# Patient Record
Sex: FEMALE | Race: BLACK OR AFRICAN AMERICAN | NOT HISPANIC OR LATINO | Employment: FULL TIME | URBAN - METROPOLITAN AREA
[De-identification: names, ages, dates, MRNs, and addresses within clinical notes are randomized per-mention and may not be internally consistent; named-entity substitution may affect disease eponyms.]

---

## 2024-02-26 ENCOUNTER — ANCILLARY PROCEDURE (OUTPATIENT)
Dept: MRI IMAGING | Facility: CLINIC | Age: 44
End: 2024-02-26
Attending: PHYSICIAN ASSISTANT
Payer: COMMERCIAL

## 2024-02-26 DIAGNOSIS — C50.919 RECURRENT BREAST CANCER (H): ICD-10-CM

## 2024-02-26 PROCEDURE — A9585 GADOBUTROL INJECTION: HCPCS | Performed by: OBSTETRICS & GYNECOLOGY

## 2024-02-26 PROCEDURE — 77049 MRI BREAST C-+ W/CAD BI: CPT

## 2024-02-26 PROCEDURE — 255N000002 HC RX 255 OP 636: Performed by: OBSTETRICS & GYNECOLOGY

## 2024-02-26 RX ORDER — GADOBUTROL 604.72 MG/ML
6.5 INJECTION INTRAVENOUS ONCE
Status: COMPLETED | OUTPATIENT
Start: 2024-02-26 | End: 2024-02-26

## 2024-02-26 RX ADMIN — GADOBUTROL 6.5 ML: 604.72 INJECTION INTRAVENOUS at 14:51

## 2024-04-27 ENCOUNTER — HOSPITAL ENCOUNTER (EMERGENCY)
Facility: CLINIC | Age: 44
Discharge: HOME | End: 2024-04-27
Attending: EMERGENCY MEDICINE | Admitting: EMERGENCY MEDICINE
Payer: COMMERCIAL

## 2024-04-27 ENCOUNTER — APPOINTMENT (OUTPATIENT)
Dept: CT IMAGING | Facility: CLINIC | Age: 44
End: 2024-04-27
Attending: EMERGENCY MEDICINE
Payer: COMMERCIAL

## 2024-04-27 VITALS
RESPIRATION RATE: 17 BRPM | HEART RATE: 88 BPM | BODY MASS INDEX: 21.62 KG/M2 | SYSTOLIC BLOOD PRESSURE: 108 MMHG | TEMPERATURE: 97.2 F | OXYGEN SATURATION: 97 % | HEIGHT: 69 IN | DIASTOLIC BLOOD PRESSURE: 88 MMHG | WEIGHT: 145.94 LBS

## 2024-04-27 DIAGNOSIS — Z90.13 HISTORY OF BILATERAL MASTECTOMY: ICD-10-CM

## 2024-04-27 DIAGNOSIS — L24.5 IRRITANT CONTACT DERMATITIS DUE TO OTHER CHEMICAL PRODUCTS: ICD-10-CM

## 2024-04-27 DIAGNOSIS — N64.89 SECONDARY SEROMA OF BREAST: ICD-10-CM

## 2024-04-27 LAB
ANION GAP SERPL CALCULATED.3IONS-SCNC: 8 MMOL/L (ref 7–15)
BASOPHILS # BLD AUTO: 0.1 10E3/UL (ref 0–0.2)
BASOPHILS NFR BLD AUTO: 1 %
BUN SERPL-MCNC: 13.6 MG/DL (ref 6–20)
CALCIUM SERPL-MCNC: 8.7 MG/DL (ref 8.6–10)
CHLORIDE SERPL-SCNC: 100 MMOL/L (ref 98–107)
CREAT SERPL-MCNC: 0.79 MG/DL (ref 0.51–0.95)
DEPRECATED HCO3 PLAS-SCNC: 23 MMOL/L (ref 22–29)
EGFRCR SERPLBLD CKD-EPI 2021: >90 ML/MIN/1.73M2
EOSINOPHIL # BLD AUTO: 0.7 10E3/UL (ref 0–0.7)
EOSINOPHIL NFR BLD AUTO: 10 %
ERYTHROCYTE [DISTWIDTH] IN BLOOD BY AUTOMATED COUNT: 13.8 % (ref 10–15)
GLUCOSE SERPL-MCNC: 90 MG/DL (ref 70–99)
HCT VFR BLD AUTO: 37.6 % (ref 35–47)
HGB BLD-MCNC: 12 G/DL (ref 11.7–15.7)
IMM GRANULOCYTES # BLD: 0 10E3/UL
IMM GRANULOCYTES NFR BLD: 0 %
LYMPHOCYTES # BLD AUTO: 1.6 10E3/UL (ref 0.8–5.3)
LYMPHOCYTES NFR BLD AUTO: 23 %
MCH RBC QN AUTO: 25.6 PG (ref 26.5–33)
MCHC RBC AUTO-ENTMCNC: 31.9 G/DL (ref 31.5–36.5)
MCV RBC AUTO: 80 FL (ref 78–100)
MONOCYTES # BLD AUTO: 0.5 10E3/UL (ref 0–1.3)
MONOCYTES NFR BLD AUTO: 7 %
NEUTROPHILS # BLD AUTO: 4 10E3/UL (ref 1.6–8.3)
NEUTROPHILS NFR BLD AUTO: 59 %
NRBC # BLD AUTO: 0 10E3/UL
NRBC BLD AUTO-RTO: 0 /100
PLATELET # BLD AUTO: 303 10E3/UL (ref 150–450)
POTASSIUM SERPL-SCNC: 4.2 MMOL/L (ref 3.4–5.3)
RBC # BLD AUTO: 4.68 10E6/UL (ref 3.8–5.2)
SODIUM SERPL-SCNC: 131 MMOL/L (ref 135–145)
WBC # BLD AUTO: 6.8 10E3/UL (ref 4–11)

## 2024-04-27 PROCEDURE — 85025 COMPLETE CBC W/AUTO DIFF WBC: CPT | Performed by: EMERGENCY MEDICINE

## 2024-04-27 PROCEDURE — 250N000011 HC RX IP 250 OP 636: Performed by: EMERGENCY MEDICINE

## 2024-04-27 PROCEDURE — 250N000009 HC RX 250: Performed by: EMERGENCY MEDICINE

## 2024-04-27 PROCEDURE — 71260 CT THORAX DX C+: CPT

## 2024-04-27 PROCEDURE — 36415 COLL VENOUS BLD VENIPUNCTURE: CPT | Performed by: EMERGENCY MEDICINE

## 2024-04-27 PROCEDURE — 80048 BASIC METABOLIC PNL TOTAL CA: CPT | Performed by: EMERGENCY MEDICINE

## 2024-04-27 PROCEDURE — 99285 EMERGENCY DEPT VISIT HI MDM: CPT | Mod: 25

## 2024-04-27 RX ORDER — IOPAMIDOL 755 MG/ML
500 INJECTION, SOLUTION INTRAVASCULAR ONCE
Status: COMPLETED | OUTPATIENT
Start: 2024-04-27 | End: 2024-04-27

## 2024-04-27 RX ORDER — PREDNISONE 20 MG/1
40 TABLET ORAL DAILY
Qty: 8 TABLET | Refills: 0 | Status: SHIPPED | OUTPATIENT
Start: 2024-04-27 | End: 2024-05-01

## 2024-04-27 RX ADMIN — IOPAMIDOL 73 ML: 755 INJECTION, SOLUTION INTRAVENOUS at 10:46

## 2024-04-27 RX ADMIN — SODIUM CHLORIDE 64 ML: 9 INJECTION, SOLUTION INTRAVENOUS at 10:46

## 2024-04-27 ASSESSMENT — ACTIVITIES OF DAILY LIVING (ADL)
ADLS_ACUITY_SCORE: 33
ADLS_ACUITY_SCORE: 35
ADLS_ACUITY_SCORE: 35

## 2024-04-27 ASSESSMENT — COLUMBIA-SUICIDE SEVERITY RATING SCALE - C-SSRS
6. HAVE YOU EVER DONE ANYTHING, STARTED TO DO ANYTHING, OR PREPARED TO DO ANYTHING TO END YOUR LIFE?: NO
1. IN THE PAST MONTH, HAVE YOU WISHED YOU WERE DEAD OR WISHED YOU COULD GO TO SLEEP AND NOT WAKE UP?: NO
2. HAVE YOU ACTUALLY HAD ANY THOUGHTS OF KILLING YOURSELF IN THE PAST MONTH?: NO

## 2024-04-27 NOTE — DISCHARGE INSTRUCTIONS
You should use a warm pack on your stroma, you can also use the Ace wrap for gentle pressure.  You should keep an eye out for potential signs of infection increasing increasing pain, redness, swelling or pus coming from your wounds.  I do recommend you follow-up with your surgeon for recheck.

## 2024-04-27 NOTE — ED TRIAGE NOTES
Double mastectomy early April related to breast cancer, had expanders place and drains. Endorses a rash started shortly after related to the tap and has persisted since then. Coming in today after getting drains removed Wednesday, she noticed fluid collection under the tissue. Stopped abx on wednesday as well. [facility]

## 2024-04-27 NOTE — ED PROVIDER NOTES
"  History     Chief Complaint:  Breast Pain    HPI   Chacha Sylvester is a 44 year old female with history of breast cancer who presents with left breast discomfort and fluid buildup. Patient had a bilateral mastectomy on 4/5/24 and had drains placed that were taken out 3 days ago. She then began experiencing rash and breast discomfort. Rash is on her torso and spreads to her arms. Endorses known left breast numbness postoperatively. No fever or chills. Denies any other medical problems. She does note that she has had cancer once in the past for which she received radiation, though she has had no radiation with this course.     Independent Historian:   Patient reports history as above.     Review of External Notes:    None     Medications:    Tamoxifen  Citalopram  Lidocaine     Past Medical History:    Breast cancer    Physical Exam   Patient Vitals for the past 24 hrs:   BP Temp Temp src Pulse Resp SpO2 Height Weight   04/27/24 1134 -- -- -- -- -- 97 % -- --   04/27/24 1133 -- -- -- -- -- 98 % -- --   04/27/24 1132 -- -- -- -- -- 98 % -- --   04/27/24 1131 -- -- -- -- -- 98 % -- --   04/27/24 1130 -- -- -- -- -- 98 % -- --   04/27/24 1002 108/88 97.2  F (36.2  C) Oral 88 17 100 % 1.753 m (5' 9\") 66.2 kg (145 lb 15.1 oz)        Physical Exam  Constitutional: Alert, attentive, GCS 15   Eyes: EOM are normal, anicteric, conjugate gaze  CV: distal extremities warm, well perfused  Chest: Non-labored breathing on RA. Right-sided total mastectomy with overlying scaling erythematous rash across right yobani chest on the right upper arm. Left breast with some superior fluctuance. No overlying erythema. No overt tenderness. Drain site clean, dry, and intact. Areola tissue intact.   GI:  non tender. No distension. No guarding or rebound.    Neurological: Alert, attentive, moving all extremities equally.   Skin: Skin is warm and dry.    Emergency Department Course     Imaging:  CT Chest w Contrast   Final Result   IMPRESSION:  "   1.  Bilateral mastectomies with tissue expanders in place. Small amount of fluid about both tissue expanders, left greater than right, which is nonspecific though may represent seromas.   2.  No focal consolidation or pleural effusion.   3.  Few tiny left upper lobe nodules are nonspecific. Recommend attention on follow-up oncologic imaging. If no follow-up imaging is planned, recommend follow up CT chest in 3-6 months given history of malignancy.        Laboratory:  Labs Ordered and Resulted from Time of ED Arrival to Time of ED Departure   BASIC METABOLIC PANEL - Abnormal       Result Value    Sodium 131 (*)     Potassium 4.2      Chloride 100      Carbon Dioxide (CO2) 23      Anion Gap 8      Urea Nitrogen 13.6      Creatinine 0.79      GFR Estimate >90      Calcium 8.7      Glucose 90     CBC WITH PLATELETS AND DIFFERENTIAL - Abnormal    WBC Count 6.8      RBC Count 4.68      Hemoglobin 12.0      Hematocrit 37.6      MCV 80      MCH 25.6 (*)     MCHC 31.9      RDW 13.8      Platelet Count 303      % Neutrophils 59      % Lymphocytes 23      % Monocytes 7      % Eosinophils 10      % Basophils 1      % Immature Granulocytes 0      NRBCs per 100 WBC 0      Absolute Neutrophils 4.0      Absolute Lymphocytes 1.6      Absolute Monocytes 0.5      Absolute Eosinophils 0.7      Absolute Basophils 0.1      Absolute Immature Granulocytes 0.0      Absolute NRBCs 0.0              Emergency Department Course & Assessments:    Interventions:  Medications   iopamidol (ISOVUE-370) solution 500 mL (73 mLs Intravenous $Given 4/27/24 1046)   CT scan flush (64 mLs Intravenous $Given 4/27/24 1046)        Assessments:  1015 I entered the patient's room and obtained history.  1145 I rechecked the patient and explained findings. I believe that they are safe for discharge at this time.     Independent Interpretation (X-rays, CTs, rhythm strip):  None    Consultations/Discussion of Management or Tests:  None        Social Determinants  of Health affecting care:   None    Disposition:  The patient was discharged.     Impression & Plan    CMS Diagnoses: None       Medical Decision Makin-year-old woman unfortunate past medical history of recurrent breast cancer underwent double mastectomy earlier this month presenting for concerns for swelling of the left breast and persistent rash over the right breast.  She had her drains taken out yesterday, she had persistent fluctuance in the left breast superiorly which was a nipple sparing mastectomy, presentation here is most consistent with a seroma, she does not have fever or chills or elevated leukocytosis.  CT imaging is suggestive of a seroma.  With respect to her rash over her right breast, this is more consistent with likely contact dermatitis she does have a remote history of radiation over this area.  I recommended topical steroids, hydrating ointment, gentle chest compression with Ace wrap and warm compress for seroma.  I recommend she follow-up with her surgeon.  Return precautions reviewed and she was discharged      Diagnosis:    ICD-10-CM    1. Secondary seroma of breast  N64.89       2. History of bilateral mastectomy  Z90.13       3. Irritant contact dermatitis due to other chemical products  L24.5            Discharge Medications:  Discharge Medication List as of 2024 12:10 PM        START taking these medications    Details   predniSONE (DELTASONE) 20 MG tablet Take 2 tablets (40 mg) by mouth daily for 4 days, Disp-8 tablet, R-0, Local Print           Lyle Arteaga MD  [employer]  12:46 PM 24     Scribe Disclosure:  Rajiv THOMAS Hired, am serving as a scribe at 10:36 AM on 2024 to document services personally performed by Lyle Arteaga MD based on my observations and the provider's statements to me.   2024   Lyle Arteaga MD Dunbar, John Forrest, MD  [phone]

## 2024-09-04 ENCOUNTER — APPOINTMENT (OUTPATIENT)
Dept: CT IMAGING | Facility: CLINIC | Age: 44
End: 2024-09-04
Attending: EMERGENCY MEDICINE
Payer: COMMERCIAL

## 2024-09-04 ENCOUNTER — HOSPITAL ENCOUNTER (EMERGENCY)
Facility: CLINIC | Age: 44
Discharge: HOME | End: 2024-09-05
Attending: EMERGENCY MEDICINE | Admitting: EMERGENCY MEDICINE
Payer: COMMERCIAL

## 2024-09-04 DIAGNOSIS — N61.0 INFECTION OF LEFT BREAST: ICD-10-CM

## 2024-09-04 LAB
ALBUMIN SERPL BCG-MCNC: 3.7 G/DL (ref 3.5–5.2)
ALP SERPL-CCNC: 70 U/L (ref 40–150)
ALT SERPL W P-5'-P-CCNC: 8 U/L (ref 0–50)
ANION GAP SERPL CALCULATED.3IONS-SCNC: 12 MMOL/L (ref 7–15)
AST SERPL W P-5'-P-CCNC: 8 U/L (ref 0–45)
BASOPHILS # BLD AUTO: 0 10E3/UL (ref 0–0.2)
BASOPHILS NFR BLD AUTO: 0 %
BILIRUB SERPL-MCNC: 0.3 MG/DL
BUN SERPL-MCNC: 8.1 MG/DL (ref 6–20)
CALCIUM SERPL-MCNC: 9.1 MG/DL (ref 8.8–10.4)
CHLORIDE SERPL-SCNC: 103 MMOL/L (ref 98–107)
CREAT SERPL-MCNC: 0.75 MG/DL (ref 0.51–0.95)
EGFRCR SERPLBLD CKD-EPI 2021: >90 ML/MIN/1.73M2
EOSINOPHIL # BLD AUTO: 0.1 10E3/UL (ref 0–0.7)
EOSINOPHIL NFR BLD AUTO: 1 %
ERYTHROCYTE [DISTWIDTH] IN BLOOD BY AUTOMATED COUNT: 15.2 % (ref 10–15)
GLUCOSE SERPL-MCNC: 108 MG/DL (ref 70–99)
HCO3 BLDV-SCNC: 25 MMOL/L (ref 21–28)
HCO3 SERPL-SCNC: 23 MMOL/L (ref 22–29)
HCT VFR BLD AUTO: 32.1 % (ref 35–47)
HGB BLD-MCNC: 10.1 G/DL (ref 11.7–15.7)
HOLD SPECIMEN: NORMAL
HOLD SPECIMEN: NORMAL
IMM GRANULOCYTES # BLD: 0 10E3/UL
IMM GRANULOCYTES NFR BLD: 0 %
LACTATE BLD-SCNC: <0.3 MMOL/L
LYMPHOCYTES # BLD AUTO: 1.4 10E3/UL (ref 0.8–5.3)
LYMPHOCYTES NFR BLD AUTO: 16 %
MCH RBC QN AUTO: 24.2 PG (ref 26.5–33)
MCHC RBC AUTO-ENTMCNC: 31.5 G/DL (ref 31.5–36.5)
MCV RBC AUTO: 77 FL (ref 78–100)
MONOCYTES # BLD AUTO: 0.9 10E3/UL (ref 0–1.3)
MONOCYTES NFR BLD AUTO: 10 %
NEUTROPHILS # BLD AUTO: 6.2 10E3/UL (ref 1.6–8.3)
NEUTROPHILS NFR BLD AUTO: 72 %
NRBC # BLD AUTO: 0 10E3/UL
NRBC BLD AUTO-RTO: 0 /100
PCO2 BLDV: 40 MM HG (ref 40–50)
PH BLDV: 7.41 [PH] (ref 7.32–7.43)
PLATELET # BLD AUTO: 275 10E3/UL (ref 150–450)
PO2 BLDV: 64 MM HG (ref 25–47)
POTASSIUM SERPL-SCNC: 4 MMOL/L (ref 3.4–5.3)
PROT SERPL-MCNC: 6.9 G/DL (ref 6.4–8.3)
RBC # BLD AUTO: 4.18 10E6/UL (ref 3.8–5.2)
SAO2 % BLDV: 92 % (ref 70–75)
SODIUM SERPL-SCNC: 138 MMOL/L (ref 135–145)
WBC # BLD AUTO: 8.7 10E3/UL (ref 4–11)

## 2024-09-04 PROCEDURE — 96361 HYDRATE IV INFUSION ADD-ON: CPT

## 2024-09-04 PROCEDURE — 80053 COMPREHEN METABOLIC PANEL: CPT | Performed by: EMERGENCY MEDICINE

## 2024-09-04 PROCEDURE — 99291 CRITICAL CARE FIRST HOUR: CPT | Mod: 25

## 2024-09-04 PROCEDURE — 85025 COMPLETE CBC W/AUTO DIFF WBC: CPT | Performed by: EMERGENCY MEDICINE

## 2024-09-04 PROCEDURE — 87040 BLOOD CULTURE FOR BACTERIA: CPT | Performed by: EMERGENCY MEDICINE

## 2024-09-04 PROCEDURE — 258N000003 HC RX IP 258 OP 636: Performed by: EMERGENCY MEDICINE

## 2024-09-04 PROCEDURE — 96360 HYDRATION IV INFUSION INIT: CPT | Mod: 59

## 2024-09-04 PROCEDURE — 250N000011 HC RX IP 250 OP 636: Performed by: EMERGENCY MEDICINE

## 2024-09-04 PROCEDURE — 99292 CRITICAL CARE ADDL 30 MIN: CPT

## 2024-09-04 PROCEDURE — 85652 RBC SED RATE AUTOMATED: CPT | Performed by: EMERGENCY MEDICINE

## 2024-09-04 PROCEDURE — 36415 COLL VENOUS BLD VENIPUNCTURE: CPT | Performed by: EMERGENCY MEDICINE

## 2024-09-04 PROCEDURE — 250N000009 HC RX 250: Performed by: EMERGENCY MEDICINE

## 2024-09-04 PROCEDURE — 86140 C-REACTIVE PROTEIN: CPT | Performed by: EMERGENCY MEDICINE

## 2024-09-04 PROCEDURE — 82803 BLOOD GASES ANY COMBINATION: CPT

## 2024-09-04 PROCEDURE — 71260 CT THORAX DX C+: CPT

## 2024-09-04 RX ORDER — ONDANSETRON 4 MG/1
4 TABLET, ORALLY DISINTEGRATING ORAL ONCE
Status: COMPLETED | OUTPATIENT
Start: 2024-09-04 | End: 2024-09-04

## 2024-09-04 RX ORDER — IOPAMIDOL 755 MG/ML
500 INJECTION, SOLUTION INTRAVASCULAR ONCE
Status: COMPLETED | OUTPATIENT
Start: 2024-09-04 | End: 2024-09-04

## 2024-09-04 RX ADMIN — IOPAMIDOL 72 ML: 755 INJECTION, SOLUTION INTRAVENOUS at 23:11

## 2024-09-04 RX ADMIN — SODIUM CHLORIDE 1000 ML: 9 INJECTION, SOLUTION INTRAVENOUS at 22:19

## 2024-09-04 RX ADMIN — SODIUM CHLORIDE 64 ML: 9 INJECTION, SOLUTION INTRAVENOUS at 23:11

## 2024-09-04 RX ADMIN — ONDANSETRON 4 MG: 4 TABLET, ORALLY DISINTEGRATING ORAL at 20:44

## 2024-09-04 ASSESSMENT — COLUMBIA-SUICIDE SEVERITY RATING SCALE - C-SSRS
2. HAVE YOU ACTUALLY HAD ANY THOUGHTS OF KILLING YOURSELF IN THE PAST MONTH?: NO
1. IN THE PAST MONTH, HAVE YOU WISHED YOU WERE DEAD OR WISHED YOU COULD GO TO SLEEP AND NOT WAKE UP?: NO
6. HAVE YOU EVER DONE ANYTHING, STARTED TO DO ANYTHING, OR PREPARED TO DO ANYTHING TO END YOUR LIFE?: NO

## 2024-09-04 ASSESSMENT — ACTIVITIES OF DAILY LIVING (ADL)
ADLS_ACUITY_SCORE: 35
ADLS_ACUITY_SCORE: 33

## 2024-09-05 VITALS
BODY MASS INDEX: 21.13 KG/M2 | WEIGHT: 143.08 LBS | OXYGEN SATURATION: 98 % | RESPIRATION RATE: 11 BRPM | HEART RATE: 84 BPM | SYSTOLIC BLOOD PRESSURE: 113 MMHG | TEMPERATURE: 98.8 F | DIASTOLIC BLOOD PRESSURE: 79 MMHG

## 2024-09-05 LAB
CRP SERPL-MCNC: 155.11 MG/L
ERYTHROCYTE [SEDIMENTATION RATE] IN BLOOD BY WESTERGREN METHOD: 88 MM/HR (ref 0–20)

## 2024-09-05 PROCEDURE — 258N000003 HC RX IP 258 OP 636: Performed by: EMERGENCY MEDICINE

## 2024-09-05 PROCEDURE — 96367 TX/PROPH/DG ADDL SEQ IV INF: CPT

## 2024-09-05 PROCEDURE — 250N000011 HC RX IP 250 OP 636: Performed by: EMERGENCY MEDICINE

## 2024-09-05 PROCEDURE — 96365 THER/PROPH/DIAG IV INF INIT: CPT

## 2024-09-05 PROCEDURE — 96366 THER/PROPH/DIAG IV INF ADDON: CPT

## 2024-09-05 PROCEDURE — 96361 HYDRATE IV INFUSION ADD-ON: CPT

## 2024-09-05 RX ORDER — PIPERACILLIN SODIUM, TAZOBACTAM SODIUM 3; .375 G/15ML; G/15ML
3.38 INJECTION, POWDER, LYOPHILIZED, FOR SOLUTION INTRAVENOUS EVERY 6 HOURS
Status: DISCONTINUED | OUTPATIENT
Start: 2024-09-05 | End: 2024-09-05 | Stop reason: HOSPADM

## 2024-09-05 RX ORDER — ACETAMINOPHEN 500 MG
1000 TABLET ORAL EVERY 6 HOURS PRN
Status: DISCONTINUED | OUTPATIENT
Start: 2024-09-05 | End: 2024-09-05 | Stop reason: HOSPADM

## 2024-09-05 RX ORDER — PIPERACILLIN SODIUM, TAZOBACTAM SODIUM 4; .5 G/20ML; G/20ML
4.5 INJECTION, POWDER, LYOPHILIZED, FOR SOLUTION INTRAVENOUS ONCE
Status: COMPLETED | OUTPATIENT
Start: 2024-09-05 | End: 2024-09-05

## 2024-09-05 RX ORDER — VANCOMYCIN HYDROCHLORIDE
1250 ONCE
Status: COMPLETED | OUTPATIENT
Start: 2024-09-05 | End: 2024-09-05

## 2024-09-05 RX ORDER — MORPHINE SULFATE 4 MG/ML
4 INJECTION, SOLUTION INTRAMUSCULAR; INTRAVENOUS ONCE
Status: COMPLETED | OUTPATIENT
Start: 2024-09-05 | End: 2024-09-05

## 2024-09-05 RX ORDER — SODIUM CHLORIDE 9 MG/ML
INJECTION, SOLUTION INTRAVENOUS ONCE
Status: DISCONTINUED | OUTPATIENT
Start: 2024-09-05 | End: 2024-09-05 | Stop reason: HOSPADM

## 2024-09-05 RX ADMIN — VANCOMYCIN HYDROCHLORIDE 1250 MG: 5 INJECTION, POWDER, LYOPHILIZED, FOR SOLUTION INTRAVENOUS at 01:21

## 2024-09-05 RX ADMIN — PIPERACILLIN AND TAZOBACTAM 4.5 G: 4; .5 INJECTION, POWDER, FOR SOLUTION INTRAVENOUS at 00:45

## 2024-09-05 RX ADMIN — MORPHINE SULFATE 4 MG: 4 INJECTION INTRAVENOUS at 09:16

## 2024-09-05 RX ADMIN — PIPERACILLIN AND TAZOBACTAM 3.38 G: 3; .375 INJECTION, POWDER, FOR SOLUTION INTRAVENOUS at 07:26

## 2024-09-05 RX ADMIN — SODIUM CHLORIDE 1000 ML: 9 INJECTION, SOLUTION INTRAVENOUS at 01:50

## 2024-09-05 ASSESSMENT — ACTIVITIES OF DAILY LIVING (ADL)
ADLS_ACUITY_SCORE: 35

## 2024-09-05 NOTE — ED TRIAGE NOTES
Patient wife states that patient has breast cancer, recently had a spacer removed 08/15, Feeling great. Then took kids to [facility] over the weekend and since has had increasing pain to left breast area, redness, swelling, drainage. Patient is nauseated, febrile, weak and in increasing pain. ABCs intact.

## 2024-09-05 NOTE — PROGRESS NOTES
Paged by [facility] ED Dr. Hilliard regarding patient with recent implant placement unclear if tissue expander vs permanent implant of the left breast. From ED provider, patient not currently febrile, tachycardic, no leucocytosis, no signs of sepsis, normal lactate, and CT with no drainable fluid collection. Given that patient is not septic and there is concern for implant related infection, I recommended IV abx and possible admission at [facility] vs transfer to [facility]. I notified the operating surgeon Dr. Warner who agreed with this plan. Any transfer to [facility] would delay care for the patient, may hinder patient receiving appropriate IV abx in a timely fashion, and may delay transfer to [facility] where the operating surgeon can appropriately treat the patient. Given that patient is not septic and does not require any urgent surgical intervention, would be okay to be discharged from the ED and follow-up at [facility] in the morning if patient is agreeable to plan. Would recommend discharge on oral abx as well. ED provider hesitant with this plan. Ultimately recommended observing patient until the morning and at that time can re-discuss [facility]. Patient does not need urgent surgical debridement and therefore transfer to [facility] would not be in their best interest, rather IV abx for 24 hours would be appropriate to trial implant salvage with close observation for worsening infectious symptoms.     Feliciano Rodriguez PRS Resident

## 2024-09-05 NOTE — ED PROVIDER NOTES
Emergency Department Note      History of Present Illness     Chief Complaint   Post-op Problem and Breast Pain      HPI   Chacha Sylvester is a 44 year old female with a history as noted below who presents to the ED today for evaluation of breast pain. The patient reports she had right breast surgery to remove cancer on 8/15/24. Her incision wounds have healed well since then, but after going to [facility] today, she reports extreme right breast pain. The breast is also swollen, red, shiny-appearing, and hot to the touch, but the incisions still look good. The patient also reports fever, nausea, diarrhea, and body aches. She hasn't had a appetite for the past few days and hasn't been eating. She reports that she was declared cancer free after the breast surgery and isn't receiving chemotherapy treatment. She does report she's taking oral hormonal therapy and receives a shot to induce menopause. She denies any other chronic conditions.    Independent Historian   None    Review of External Notes   Operative note from 8/15/2024 reviewed.  Had and implant exchange of the left breast with breast expander.    Past Medical History     Medical History and Problem List   Right breast cancer  Cervical intraepithelial neopasia grade 1  Genital herpes  Malignant neoplasm of right breast  Seborrheic dermatitis   Leiomyoma of uterus    Medications   Ferrous gluconate  Keflex  Celexa  Etodolac  Norco  Letrozole  Oxycodone  Polytrim  Senna  Tamoxifen    Surgical History   Dental oral surgery  Lasik    Physical Exam     Patient Vitals for the past 24 hrs:   BP Temp Temp src Pulse Resp SpO2 Weight   09/05/24 0118 100/65 -- -- 75 -- 96 % --   09/05/24 0100 102/69 -- -- 80 -- 97 % --   09/05/24 0048 99/68 -- -- 80 -- 97 % --   09/05/24 0045 99/68 -- -- 80 -- 98 % --   09/05/24 0038 96/67 -- -- 86 -- 97 % --   09/05/24 0015 101/60 -- -- 83 -- 97 % --   09/05/24 0010 101/60 98.8  F (37.1  C) Oral 87 -- 97 % --   09/05/24 0000 99/62 --  -- 83 -- 96 % --   09/04/24 2345 103/70 -- -- 86 -- 96 % --   09/04/24 2330 109/76 -- -- 81 -- 98 % --   09/04/24 2300 -- -- -- 86 -- 98 % --   09/04/24 2245 101/74 -- -- 90 -- 95 % --   09/04/24 2230 103/74 -- -- 83 -- 95 % --   09/04/24 2225 102/72 -- -- 87 -- 99 % --   09/04/24 2039 128/79 (!) 101.7  F (38.7  C) Oral 118 20 96 % 64.9 kg (143 lb 1.3 oz)     Physical Exam  General: Laying on the ED bed  HEENT: Normocephalic, atraumatic  Cardiac: Warm and well perfused, regular tachycardia  Pulm: Breathing comfortably, no accessory muscle usage, no conversational dyspnea, and lungs clear bilaterally  MSK: No bony deformities  Skin: Left breast: Diffuse tenderness over the entirety of the left breast and tracking toward the left axilla with erythema and induration  Neuro: Moves all extremities  Psych: Pleasant mood and affect      Diagnostics     Lab Results   Labs Ordered and Resulted from Time of ED Arrival to Time of ED Departure   COMPREHENSIVE METABOLIC PANEL - Abnormal       Result Value    Sodium 138      Potassium 4.0      Carbon Dioxide (CO2) 23      Anion Gap 12      Urea Nitrogen 8.1      Creatinine 0.75      GFR Estimate >90      Calcium 9.1      Chloride 103      Glucose 108 (*)     Alkaline Phosphatase 70      AST 8      ALT 8      Protein Total 6.9      Albumin 3.7      Bilirubin Total 0.3     CBC WITH PLATELETS AND DIFFERENTIAL - Abnormal    WBC Count 8.7      RBC Count 4.18      Hemoglobin 10.1 (*)     Hematocrit 32.1 (*)     MCV 77 (*)     MCH 24.2 (*)     MCHC 31.5      RDW 15.2 (*)     Platelet Count 275      % Neutrophils 72      % Lymphocytes 16      % Monocytes 10      % Eosinophils 1      % Basophils 0      % Immature Granulocytes 0      NRBCs per 100 WBC 0      Absolute Neutrophils 6.2      Absolute Lymphocytes 1.4      Absolute Monocytes 0.9      Absolute Eosinophils 0.1      Absolute Basophils 0.0      Absolute Immature Granulocytes 0.0      Absolute NRBCs 0.0     ISTAT GASES LACTATE VENOUS  [id number] [employer]. [address] [place] [address], [place] [address]. POCT - Abnormal    Lactic Acid POCT <0.3      Bicarbonate Venous POCT 25      O2 Sat, Venous POCT 92 (*)     pCO2 Venous POCT 40      pH Venous POCT 7.41      pO2 Venous POCT 64 (*)    CRP INFLAMMATION - Abnormal    CRP Inflammation 155.11 (*)    ERYTHROCYTE SEDIMENTATION RATE AUTO   BLOOD CULTURE       Imaging   CT Chest w Contrast   Final Result   IMPRESSION:    1.  Postsurgical changes of bilateral breast implants. There is cutaneous thickening and subcutaneous fat stranding of the left anterior chest/breast area with associated soft tissue swelling of left pectoralis muscle underlying the left breast implant,    findings likely infectious. No discrete fluid collection visualized.        Independent Interpretation   None    ED Course      Medications Administered   Medications   sodium chloride (PF) 0.9% PF flush 3 mL (has no administration in time range)   sodium chloride (PF) 0.9% PF flush 3 mL (3 mLs Intracatheter Not Given 9/5/24 0114)   vancomycin (VANCOCIN) 1,250 mg in 0.9% NaCl 250 mL intermittent infusion (1,250 mg Intravenous $New Bag 9/5/24 0121)   piperacillin-tazobactam (ZOSYN) 3.375 g vial to attach to  mL bag (has no administration in time range)   sodium chloride 0.9% BOLUS 1,000 mL (1,000 mLs Intravenous $New Bag 9/5/24 0150)   acetaminophen (TYLENOL) tablet 1,000 mg (has no administration in time range)   ondansetron (ZOFRAN ODT) ODT tab 4 mg (4 mg Oral $Given 9/4/24 2044)   sodium chloride 0.9% BOLUS 1,000 mL (0 mLs Intravenous Stopped 9/4/24 2354)   CT Scan Flush (64 mLs Intravenous $Given 9/4/24 2311)   iopamidol (ISOVUE-370) solution 500 mL (72 mLs Intravenous $Given 9/4/24 2311)   piperacillin-tazobactam (ZOSYN) 4.5 g vial to attach to  mL bag (0 g Intravenous Stopped 9/5/24 0121)     Discussion of Management   Plastic surgery resident Dr. Rodriguez.    ED Course   ED Course as of 09/05/24 0154   Wed Sep 04, 2024   2150 I obtained history and examined the patient as noted above.   health care provider 7-10 days after the procedure.    SEEK MEDICAL CARE IF:  •  You have a fever.  •  You continue to have pain and soreness around the injection site, even after taking medicines.  •  You have significant nausea or vomiting.  •  You have a severe headache.    SEEK IMMEDIATE MEDICAL CARE IF:  •  You have severe pain at the injection site, which is not relieved by medicines.  •  You have a severe headache, stiff neck, or sensitivity to light.  •  You have any new numbness or weakness in your legs or arms.  •  You lose control over your bladder or bowel movements.  •  You have difficulty breathing.               Additional Documentation  None    Medical Decision Making / Diagnosis     CMS Diagnoses: None    MIPS       None    MDM   Chacha Sylvester is a 44 year old female who presents febrile, tachycardic, likely from a left breast infection in the recent postoperative setting.  Lactate is negative and there is no leukocytosis, lowering suspicion for sepsis.  CT of the chest shows findings consistent with soft tissue infection around the left breast.  The patient's fever and tachycardia did resolve on their own.  Since we are unable to [facility], I discussed the patient with the plastic surgery team at [facility].  They recommended broad-spectrum antibiotic coverage with vancomycin and Zosyn.  The [facility] plastic surgery resident notified the [facility] operating [facility] Dr[facility] Warner that the patient is here in the ED at [facility].  Dr. Warner reportedly agreed with the plan for IV antibiotics and that the patient should be seen at the [facility] for further evaluation.  Unfortunately, when we called the [facility] our coordinator was told that they are full and cannot accept the patient.  I discussed the patient again with Dr. Rodriguez and requested transfer to [facility] where there is plastic surgery support, though he declined stating that it would be best for the patient to be seen by her operating surgeon at the [facility].  This leaves us in a difficult situation.  I do not think that admission to [facility] is appropriate since we do not have plastic surgery coverage here.  I have signed the patient out to my colleague, Dr. Coto.  We are currently reapproaching the possibility of a ED to ED transfer to the [facility] for further care.     Disposition   Care of the patient was transferred to my colleague Dr. Coto pending further discussion with the [facility].     Diagnosis     ICD-10-CM    1. Infection of left breast  N61.0            Scribe Disclosure:  I, Jaky Chase, am serving as a scribe at 10:06 PM on 9/4/2024 to  document services personally performed by Santos Hilliard MD based on my observations and the provider's statements to me.        Santos Hilliard MD  09/05/24 0154

## 2024-09-05 NOTE — ED NOTES
I received signout from a partner, Dr. Coto.  Please see the initial H&P for full specifics.  Briefly, the patient had an implant exchange of her left breast with a breast expander on 8/15/2024.  She had reported increased redness, pain, and warmth to the breast after going to [facility] yesterday.  She presented and infection was noted on CT.  Apparently the [facility] did not [facility] and [facility].  After discussion with plastic surgery, IV antibiotics were recommended and the plan would be to try transfer later.      0831: D/W Talisha Nick (plastics @ [facility]). No new recommendations.    0911: Updated patient.  She was on the phone with what sounds like a coordinator at the [facility].  The patient and her significant other states that if they do not hear from the [facility] by 10:00 or so that they will asked to be discharged and then drive to the [facility] for reevaluation.    0926: I talked with the patient's family member who heard back from the surgical team.  They will plan on asking for discharge from here and going directly the emergency department.  They request copies of lab results and imaging studies.  I will have the CT department make a copy of the CT chest.  Patient and family know to [facility] directly to the [facility] emergency department     Obie Butler, DO  09/05/24 0836       Obie Butler, DO  09/05/24 0924       Obie Butler, DO  09/05/24 0927

## 2024-09-05 NOTE — ED PROVIDER NOTES
Patient signed out to Dr. Butler pending transfer and acceptance to hopefully [facility] for continuity of care.  Patient on scheduled antibiotics, zosyn/vanco, for management of post op breast infection.  She is to require plastics consultation.  No acute events overnight.     Marleny Coto, DO  09/05/24 0646

## 2024-09-10 LAB — BACTERIA BLD CULT: NO GROWTH

## 2025-06-23 ENCOUNTER — PATIENT OUTREACH (OUTPATIENT)
Dept: CARE COORDINATION | Facility: CLINIC | Age: 45
End: 2025-06-23
Payer: OTHER GOVERNMENT